# Patient Record
Sex: MALE | Race: BLACK OR AFRICAN AMERICAN | NOT HISPANIC OR LATINO | URBAN - METROPOLITAN AREA
[De-identification: names, ages, dates, MRNs, and addresses within clinical notes are randomized per-mention and may not be internally consistent; named-entity substitution may affect disease eponyms.]

---

## 2024-06-04 ENCOUNTER — HOSPITAL ENCOUNTER (EMERGENCY)
Facility: CLINIC | Age: 39
Discharge: HOME | End: 2024-06-04
Attending: FAMILY MEDICINE | Admitting: FAMILY MEDICINE
Payer: COMMERCIAL

## 2024-06-04 ENCOUNTER — APPOINTMENT (OUTPATIENT)
Dept: CT IMAGING | Facility: CLINIC | Age: 39
End: 2024-06-04
Attending: FAMILY MEDICINE
Payer: COMMERCIAL

## 2024-06-04 VITALS
RESPIRATION RATE: 18 BRPM | DIASTOLIC BLOOD PRESSURE: 73 MMHG | TEMPERATURE: 98.4 F | SYSTOLIC BLOOD PRESSURE: 120 MMHG | OXYGEN SATURATION: 99 % | WEIGHT: 180 LBS | HEART RATE: 73 BPM

## 2024-06-04 DIAGNOSIS — R10.11 RIGHT UPPER QUADRANT ABDOMINAL PAIN: ICD-10-CM

## 2024-06-04 LAB
ALBUMIN SERPL BCG-MCNC: 4.2 G/DL (ref 3.5–5.2)
ALBUMIN UR-MCNC: NEGATIVE MG/DL
ALP SERPL-CCNC: 117 U/L (ref 40–150)
ALT SERPL W P-5'-P-CCNC: 49 U/L (ref 0–70)
ANION GAP SERPL CALCULATED.3IONS-SCNC: 8 MMOL/L (ref 7–15)
APPEARANCE UR: CLEAR
AST SERPL W P-5'-P-CCNC: 53 U/L (ref 0–45)
BASOPHILS # BLD AUTO: 0 10E3/UL (ref 0–0.2)
BASOPHILS NFR BLD AUTO: 1 %
BILIRUB SERPL-MCNC: 0.4 MG/DL
BILIRUB UR QL STRIP: NEGATIVE
BUN SERPL-MCNC: 5.4 MG/DL (ref 6–20)
CALCIUM SERPL-MCNC: 9 MG/DL (ref 8.6–10)
CHLORIDE SERPL-SCNC: 99 MMOL/L (ref 98–107)
COLOR UR AUTO: ABNORMAL
CREAT SERPL-MCNC: 0.73 MG/DL (ref 0.67–1.17)
DEPRECATED HCO3 PLAS-SCNC: 26 MMOL/L (ref 22–29)
EGFRCR SERPLBLD CKD-EPI 2021: >90 ML/MIN/1.73M2
EOSINOPHIL # BLD AUTO: 0.1 10E3/UL (ref 0–0.7)
EOSINOPHIL NFR BLD AUTO: 2 %
ERYTHROCYTE [DISTWIDTH] IN BLOOD BY AUTOMATED COUNT: 12.1 % (ref 10–15)
GLUCOSE SERPL-MCNC: 182 MG/DL (ref 70–99)
GLUCOSE UR STRIP-MCNC: NEGATIVE MG/DL
HCT VFR BLD AUTO: 41.1 % (ref 40–53)
HGB BLD-MCNC: 14.4 G/DL (ref 13.3–17.7)
HGB UR QL STRIP: NEGATIVE
IMM GRANULOCYTES # BLD: 0 10E3/UL
IMM GRANULOCYTES NFR BLD: 0 %
KETONES UR STRIP-MCNC: NEGATIVE MG/DL
LEUKOCYTE ESTERASE UR QL STRIP: NEGATIVE
LIPASE SERPL-CCNC: 21 U/L (ref 13–60)
LYMPHOCYTES # BLD AUTO: 2.5 10E3/UL (ref 0.8–5.3)
LYMPHOCYTES NFR BLD AUTO: 55 %
MCH RBC QN AUTO: 31.4 PG (ref 26.5–33)
MCHC RBC AUTO-ENTMCNC: 35 G/DL (ref 31.5–36.5)
MCV RBC AUTO: 90 FL (ref 78–100)
MONOCYTES # BLD AUTO: 0.7 10E3/UL (ref 0–1.3)
MONOCYTES NFR BLD AUTO: 15 %
NEUTROPHILS # BLD AUTO: 1.2 10E3/UL (ref 1.6–8.3)
NEUTROPHILS NFR BLD AUTO: 27 %
NITRATE UR QL: NEGATIVE
NRBC # BLD AUTO: 0 10E3/UL
NRBC BLD AUTO-RTO: 0 /100
PH UR STRIP: 8.5 [PH] (ref 5–7)
PLATELET # BLD AUTO: 226 10E3/UL (ref 150–450)
POTASSIUM SERPL-SCNC: 4.1 MMOL/L (ref 3.4–5.3)
PROT SERPL-MCNC: 7.7 G/DL (ref 6.4–8.3)
RBC # BLD AUTO: 4.59 10E6/UL (ref 4.4–5.9)
RBC URINE: <1 /HPF
SODIUM SERPL-SCNC: 133 MMOL/L (ref 135–145)
SP GR UR STRIP: >1.05 (ref 1–1.03)
SQUAMOUS EPITHELIAL: 1 /HPF
UROBILINOGEN UR STRIP-MCNC: NORMAL MG/DL
WBC # BLD AUTO: 4.5 10E3/UL (ref 4–11)
WBC URINE: 1 /HPF

## 2024-06-04 PROCEDURE — 74177 CT ABD & PELVIS W/CONTRAST: CPT

## 2024-06-04 PROCEDURE — 81001 URINALYSIS AUTO W/SCOPE: CPT | Performed by: FAMILY MEDICINE

## 2024-06-04 PROCEDURE — 85004 AUTOMATED DIFF WBC COUNT: CPT | Performed by: FAMILY MEDICINE

## 2024-06-04 PROCEDURE — 99285 EMERGENCY DEPT VISIT HI MDM: CPT | Mod: 25 | Performed by: FAMILY MEDICINE

## 2024-06-04 PROCEDURE — 83690 ASSAY OF LIPASE: CPT | Performed by: FAMILY MEDICINE

## 2024-06-04 PROCEDURE — 99284 EMERGENCY DEPT VISIT MOD MDM: CPT | Performed by: FAMILY MEDICINE

## 2024-06-04 PROCEDURE — 36415 COLL VENOUS BLD VENIPUNCTURE: CPT | Performed by: FAMILY MEDICINE

## 2024-06-04 PROCEDURE — 80053 COMPREHEN METABOLIC PANEL: CPT | Performed by: FAMILY MEDICINE

## 2024-06-04 PROCEDURE — 250N000009 HC RX 250: Performed by: FAMILY MEDICINE

## 2024-06-04 PROCEDURE — 250N000011 HC RX IP 250 OP 636: Performed by: FAMILY MEDICINE

## 2024-06-04 RX ORDER — IOPAMIDOL 755 MG/ML
100 INJECTION, SOLUTION INTRAVASCULAR ONCE
Status: COMPLETED | OUTPATIENT
Start: 2024-06-04 | End: 2024-06-04

## 2024-06-04 RX ORDER — FAMOTIDINE 20 MG/1
20 TABLET, FILM COATED ORAL 2 TIMES DAILY
Qty: 30 TABLET | Refills: 0 | Status: SHIPPED | OUTPATIENT
Start: 2024-06-04

## 2024-06-04 RX ADMIN — IOPAMIDOL 89 ML: 755 INJECTION, SOLUTION INTRAVENOUS at 18:11

## 2024-06-04 RX ADMIN — SODIUM CHLORIDE 44 ML: 9 INJECTION, SOLUTION INTRAVENOUS at 18:11

## 2024-06-04 ASSESSMENT — ACTIVITIES OF DAILY LIVING (ADL)
ADLS_ACUITY_SCORE: 35
ADLS_ACUITY_SCORE: 35
ADLS_ACUITY_SCORE: 33
ADLS_ACUITY_SCORE: 35
ADLS_ACUITY_SCORE: 35

## 2024-06-04 ASSESSMENT — COLUMBIA-SUICIDE SEVERITY RATING SCALE - C-SSRS
1. IN THE PAST MONTH, HAVE YOU WISHED YOU WERE DEAD OR WISHED YOU COULD GO TO SLEEP AND NOT WAKE UP?: NO
2. HAVE YOU ACTUALLY HAD ANY THOUGHTS OF KILLING YOURSELF IN THE PAST MONTH?: NO
6. HAVE YOU EVER DONE ANYTHING, STARTED TO DO ANYTHING, OR PREPARED TO DO ANYTHING TO END YOUR LIFE?: NO

## 2024-06-04 NOTE — ED PROVIDER NOTES
ED Provider Note  [facility]      History     Chief Complaint   Patient presents with    Abdominal Pain     Right abdomen pain that started yesterday when he was playing soccer.  Denies any injuries then.  Also having subjective fevers and did vomit one time last night.  Currently has nausea.     The history is provided by the patient and medical records.     Venkat Tompkins is a 38 year old male with no medical history on file presents to the emergency department due to abdominal pain x 1 day.    [facility]  used via iPad.  Patient is having abdominal pain.  Pain began yesterday with right upper quadrant, which then moved to the middle, then left, then wrapped around to his left kidney.  Patient states that pain is constant, is unsure what makes it worse, states food does not make it worse.  Patient states pain gets bad enough that he has to sit down and rest.  Patient denies fever, troubles breathing, diarrhea, current nausea, or urinary problems.  Denies surgery on stomach.  Patient does have constipation.    Past Medical History  No past medical history on file.  No past surgical history on file.  No current outpatient medications on file.    No Known Allergies  Family History  No family history on file.  Social History       A medically appropriate review of systems was performed with pertinent positives and negatives noted in the HPI, and all other systems negative.    Physical Exam   BP: 121/77  Pulse: 81  Temp: 98.4  F (36.9  C)  Resp: 16  Weight: 81.6 kg (180 lb)  SpO2: 99 %  Physical Exam  Vitals and nursing note reviewed.   Constitutional:       General: He is not in acute distress.     Appearance: Normal appearance. He is not toxic-appearing.   HENT:      Head: Atraumatic.   Eyes:      General: No scleral icterus.     Conjunctiva/sclera: Conjunctivae normal.   Cardiovascular:      Rate and Rhythm: Normal rate.      Heart sounds: Normal heart sounds.   Pulmonary:       Effort: Pulmonary effort is normal. No respiratory distress.      Breath sounds: Normal breath sounds.   Abdominal:      Palpations: Abdomen is soft.      Tenderness: There is abdominal tenderness in the right upper quadrant and periumbilical area. There is no guarding or rebound.   Musculoskeletal:         General: No deformity.      Cervical back: Neck supple.   Skin:     General: Skin is warm.   Neurological:      Mental Status: He is alert.           ED Course, Procedures, & Data      Procedures                 Results for orders placed or performed during the hospital encounter of 06/04/24   Comprehensive metabolic panel     Status: Abnormal   Result Value Ref Range    Sodium 133 (L) 135 - 145 mmol/L    Potassium 4.1 3.4 - 5.3 mmol/L    Carbon Dioxide (CO2) 26 22 - 29 mmol/L    Anion Gap 8 7 - 15 mmol/L    Urea Nitrogen 5.4 (L) 6.0 - 20.0 mg/dL    Creatinine 0.73 0.67 - 1.17 mg/dL    GFR Estimate >90 >60 mL/min/1.73m2    Calcium 9.0 8.6 - 10.0 mg/dL    Chloride 99 98 - 107 mmol/L    Glucose 182 (H) 70 - 99 mg/dL    Alkaline Phosphatase 117 40 - 150 U/L    AST 53 (H) 0 - 45 U/L    ALT 49 0 - 70 U/L    Protein Total 7.7 6.4 - 8.3 g/dL    Albumin 4.2 3.5 - 5.2 g/dL    Bilirubin Total 0.4 <=1.2 mg/dL   Lipase     Status: Normal   Result Value Ref Range    Lipase 21 13 - 60 U/L   CBC with platelets and differential     Status: Abnormal   Result Value Ref Range    WBC Count 4.5 4.0 - 11.0 10e3/uL    RBC Count 4.59 4.40 - 5.90 10e6/uL    Hemoglobin 14.4 13.3 - 17.7 g/dL    Hematocrit 41.1 40.0 - 53.0 %    MCV 90 78 - 100 fL    MCH 31.4 26.5 - 33.0 pg    MCHC 35.0 31.5 - 36.5 g/dL    RDW 12.1 10.0 - 15.0 %    Platelet Count 226 150 - 450 10e3/uL    % Neutrophils 27 %    % Lymphocytes 55 %    % Monocytes 15 %    % Eosinophils 2 %    % Basophils 1 %    % Immature Granulocytes 0 %    NRBCs per 100 WBC 0 <1 /100    Absolute Neutrophils 1.2 (L) 1.6 - 8.3 10e3/uL    Absolute Lymphocytes 2.5 0.8 - 5.3 10e3/uL    Absolute  Monocytes 0.7 0.0 - 1.3 10e3/uL    Absolute Eosinophils 0.1 0.0 - 0.7 10e3/uL    Absolute Basophils 0.0 0.0 - 0.2 10e3/uL    Absolute Immature Granulocytes 0.0 <=0.4 10e3/uL    Absolute NRBCs 0.0 10e3/uL   CBC with platelets differential     Status: Abnormal    Narrative    The following orders were created for panel order CBC with platelets differential.  Procedure                               Abnormality         Status                     ---------                               -----------         ------                     CBC with platelets and d...[[id number]]  Abnormal            Final result                 Please view results for these tests on the individual orders.     Medications - No data to display  Labs Ordered and Resulted from Time of ED Arrival to Time of ED Departure   COMPREHENSIVE METABOLIC PANEL - Abnormal       Result Value    Sodium 133 (*)     Potassium 4.1      Carbon Dioxide (CO2) 26      Anion Gap 8      Urea Nitrogen 5.4 (*)     Creatinine 0.73      GFR Estimate >90      Calcium 9.0      Chloride 99      Glucose 182 (*)     Alkaline Phosphatase 117      AST 53 (*)     ALT 49      Protein Total 7.7      Albumin 4.2      Bilirubin Total 0.4     CBC WITH PLATELETS AND DIFFERENTIAL - Abnormal    WBC Count 4.5      RBC Count 4.59      Hemoglobin 14.4      Hematocrit 41.1      MCV 90      MCH 31.4      MCHC 35.0      RDW 12.1      Platelet Count 226      % Neutrophils 27      % Lymphocytes 55      % Monocytes 15      % Eosinophils 2      % Basophils 1      % Immature Granulocytes 0      NRBCs per 100 WBC 0      Absolute Neutrophils 1.2 (*)     Absolute Lymphocytes 2.5      Absolute Monocytes 0.7      Absolute Eosinophils 0.1      Absolute Basophils 0.0      Absolute Immature Granulocytes 0.0      Absolute NRBCs 0.0     LIPASE - Normal    Lipase 21     ROUTINE UA WITH MICROSCOPIC REFLEX TO CULTURE     No orders to display          Critical care was not performed.     Medical Decision Making  The  patient's presentation was of moderate complexity (an acute illness with systemic symptoms).    The patient's evaluation involved:  ordering and/or review of 3+ test(s) in this encounter (see separate area of note for details)    The patient's management necessitated further care after sign-out to Dr. Benson (see their note for further management).    Assessment & Plan    Is a healthy 38-year-old male with acute onset of right upper quadrant pain, now also some right flank pain and periumbilical pain.  Initial differential diagnosis included but was not restricted to cholecystitis or cholelithiasis, cholangitis, pancreatitis, typical presentation of appendicitis gastritis, peptic ulcer disease, duodenitis, small bowel obstruction,  pyelonephritis, ureterolithiasis, as well as numerous other life-threatening and the life-threatening causes of epigastric and right upper quadrant pain.  Patient's labs with mild elevation of AST otherwise unremarkable.  His urinalysis is still pending.  I reexamined him he is  but without surgical findings.  Point a CT of the abdomen pelvis was ordered to rule out surgical or life-threatening pathology.  If negative could likely be discharged home with symptomatic treatment, patient is being signed out to Dr. Benson at shift change to review CT.      I have reviewed the nursing notes. I have reviewed the findings, diagnosis, plan and need for follow up with the patient.    New Prescriptions    No medications on file       Final diagnoses:   Right upper quadrant abdominal pain   I, Gerardo Garrett, am serving as a trained medical scribe to document services personally performed by Milo Grove MD, based to the provider's statements to me.     IMilo MD, was physically present and have reviewed and verified the accuracy of this note documented by Gerardo Garrett.       Milo Grove MD  [facility] EMERGENCY DEPARTMENT  6/4/2024     Milo Grove,  MD  06/04/24 3573

## 2024-06-04 NOTE — ED TRIAGE NOTES
Triage Assessment (Adult)       Row Name 06/04/24 1427          Triage Assessment    Airway WDL WDL        Respiratory WDL    Respiratory WDL WDL        Skin Circulation/Temperature WDL    Skin Circulation/Temperature WDL WDL        Cardiac WDL    Cardiac WDL WDL        Peripheral/Neurovascular WDL    Peripheral Neurovascular WDL WDL        Cognitive/Neuro/Behavioral WDL    Cognitive/Neuro/Behavioral WDL WDL

## 2024-06-05 NOTE — ED PROVIDER NOTES
Patient received as sign-out at change of shift.  Please see initial note for complete details.  38 year old male who presented to the ED with upper abdominal pain.  Awaiting abdominal CT.  Acute events during this shift:   Results for orders placed or performed during the hospital encounter of 06/04/24   CT Abdomen Pelvis w Contrast     Status: None    Narrative    EXAM: CT ABDOMEN PELVIS W CONTRAST  LOCATION: [facility]  DATE: 6/4/2024    INDICATION: Right sided abdominal pain  COMPARISON: None.  TECHNIQUE: CT scan of the abdomen and pelvis was performed following injection of IV contrast. Multiplanar reformats were obtained. Dose reduction techniques were used.  CONTRAST: 89mL Isovue 370    FINDINGS:   LOWER CHEST: Normal.    HEPATOBILIARY: Diffuse hepatic steatosis. No significant mass. No bile duct dilatation. No calcified gallstones.    PANCREAS: Normal.    SPLEEN: Normal.    ADRENAL GLANDS: Normal.    KIDNEYS/BLADDER: No significant mass, stone, or hydronephrosis. Overdistended bladder.    BOWEL: Normal.    LYMPH NODES: Normal.    VASCULATURE: Normal.    PELVIC ORGANS: Normal.    MUSCULOSKELETAL: Normal.      Impression    IMPRESSION:   1.  No acute findings in the abdomen or pelvis.    2.  Hepatic steatosis.    3.  Overdistended urinary bladder, but no evidence of acute inflammation.   Comprehensive metabolic panel     Status: Abnormal   Result Value Ref Range    Sodium 133 (L) 135 - 145 mmol/L    Potassium 4.1 3.4 - 5.3 mmol/L    Carbon Dioxide (CO2) 26 22 - 29 mmol/L    Anion Gap 8 7 - 15 mmol/L    Urea Nitrogen 5.4 (L) 6.0 - 20.0 mg/dL    Creatinine 0.73 0.67 - 1.17 mg/dL    GFR Estimate >90 >60 mL/min/1.73m2    Calcium 9.0 8.6 - 10.0 mg/dL    Chloride 99 98 - 107 mmol/L    Glucose 182 (H) 70 - 99 mg/dL    Alkaline Phosphatase 117 40 - 150 U/L    AST 53 (H) 0 - 45 U/L    ALT 49 0 - 70 U/L    Protein Total 7.7 6.4 - 8.3 g/dL    Albumin 4.2 3.5 - 5.2 g/dL     Bilirubin Total 0.4 <=1.2 mg/dL   Lipase     Status: Normal   Result Value Ref Range    Lipase 21 13 - 60 U/L   UA with Microscopic reflex to Culture     Status: Abnormal    Specimen: Urine, Midstream   Result Value Ref Range    Color Urine Straw Colorless, Straw, Light Yellow, Yellow    Appearance Urine Clear Clear    Glucose Urine Negative Negative mg/dL    Bilirubin Urine Negative Negative    Ketones Urine Negative Negative mg/dL    Specific Gravity Urine >1.050 (H) 1.003 - 1.035    Blood Urine Negative Negative    pH Urine 8.5 (H) 5.0 - 7.0    Protein Albumin Urine Negative Negative mg/dL    Urobilinogen Urine Normal Normal, 2.0 mg/dL    Nitrite Urine Negative Negative    Leukocyte Esterase Urine Negative Negative    RBC Urine <1 <=2 /HPF    WBC Urine 1 <=5 /HPF    Squamous Epithelials Urine 1 <=1 /HPF    Narrative    Urine Culture not indicated   CBC with platelets and differential     Status: Abnormal   Result Value Ref Range    WBC Count 4.5 4.0 - 11.0 10e3/uL    RBC Count 4.59 4.40 - 5.90 10e6/uL    Hemoglobin 14.4 13.3 - 17.7 g/dL    Hematocrit 41.1 40.0 - 53.0 %    MCV 90 78 - 100 fL    MCH 31.4 26.5 - 33.0 pg    MCHC 35.0 31.5 - 36.5 g/dL    RDW 12.1 10.0 - 15.0 %    Platelet Count 226 150 - 450 10e3/uL    % Neutrophils 27 %    % Lymphocytes 55 %    % Monocytes 15 %    % Eosinophils 2 %    % Basophils 1 %    % Immature Granulocytes 0 %    NRBCs per 100 WBC 0 <1 /100    Absolute Neutrophils 1.2 (L) 1.6 - 8.3 10e3/uL    Absolute Lymphocytes 2.5 0.8 - 5.3 10e3/uL    Absolute Monocytes 0.7 0.0 - 1.3 10e3/uL    Absolute Eosinophils 0.1 0.0 - 0.7 10e3/uL    Absolute Basophils 0.0 0.0 - 0.2 10e3/uL    Absolute Immature Granulocytes 0.0 <=0.4 10e3/uL    Absolute NRBCs 0.0 10e3/uL   CBC with platelets differential     Status: Abnormal    Narrative    The following orders were created for panel order CBC with platelets differential.  Procedure                               Abnormality         Status                      ---------                               -----------         ------                     CBC with platelets and d...[[id number]]  Abnormal            Final result                 Please view results for these tests on the individual orders.   Will discharge to home on famotidine.     Kareem Benson MD  06/04/24 1951

## 2024-06-05 NOTE — DISCHARGE INSTRUCTIONS
Take famotidine as directed.    Follow-up with your primary care clinic in 1 week.    Return if fever, vomiting, or other concerns.

## 2024-12-16 ENCOUNTER — OFFICE VISIT (OUTPATIENT)
Dept: FAMILY MEDICINE | Facility: CLINIC | Age: 39
End: 2024-12-16
Payer: COMMERCIAL

## 2024-12-16 ENCOUNTER — VIRTUAL VISIT (OUTPATIENT)
Dept: INTERPRETER SERVICES | Facility: CLINIC | Age: 39
End: 2024-12-16
Payer: COMMERCIAL

## 2024-12-16 VITALS
WEIGHT: 179 LBS | TEMPERATURE: 97.9 F | HEART RATE: 61 BPM | RESPIRATION RATE: 16 BRPM | HEIGHT: 68 IN | DIASTOLIC BLOOD PRESSURE: 85 MMHG | BODY MASS INDEX: 27.13 KG/M2 | OXYGEN SATURATION: 97 % | SYSTOLIC BLOOD PRESSURE: 127 MMHG

## 2024-12-16 DIAGNOSIS — J02.9 ACUTE PHARYNGITIS, UNSPECIFIED ETIOLOGY: ICD-10-CM

## 2024-12-16 DIAGNOSIS — Z86.19 HISTORY OF HEPATITIS B: ICD-10-CM

## 2024-12-16 DIAGNOSIS — Z78.9 HISTORY OF MEASLES, MUMPS, RUBELLA (MMR) VACCINATION UNKNOWN: ICD-10-CM

## 2024-12-16 DIAGNOSIS — Z11.4 SCREENING FOR HIV (HUMAN IMMUNODEFICIENCY VIRUS): ICD-10-CM

## 2024-12-16 DIAGNOSIS — Z13.1 SCREENING FOR DIABETES MELLITUS: ICD-10-CM

## 2024-12-16 DIAGNOSIS — Z23 NEED FOR VACCINATION: ICD-10-CM

## 2024-12-16 DIAGNOSIS — Z76.89 ENCOUNTER TO ESTABLISH CARE WITH NEW DOCTOR: Primary | ICD-10-CM

## 2024-12-16 DIAGNOSIS — K76.0 HEPATIC STEATOSIS: ICD-10-CM

## 2024-12-16 DIAGNOSIS — Z11.59 NEED FOR HEPATITIS C SCREENING TEST: ICD-10-CM

## 2024-12-16 LAB
ALBUMIN SERPL BCG-MCNC: 4.3 G/DL (ref 3.5–5.2)
ALP SERPL-CCNC: 149 U/L (ref 40–150)
ALT SERPL W P-5'-P-CCNC: 38 U/L (ref 0–70)
ANION GAP SERPL CALCULATED.3IONS-SCNC: 10 MMOL/L (ref 7–15)
AST SERPL W P-5'-P-CCNC: 30 U/L (ref 0–45)
BILIRUB SERPL-MCNC: 0.4 MG/DL
BUN SERPL-MCNC: 10.7 MG/DL (ref 6–20)
CALCIUM SERPL-MCNC: 9.5 MG/DL (ref 8.8–10.4)
CHLORIDE SERPL-SCNC: 95 MMOL/L (ref 98–107)
CHOLEST SERPL-MCNC: 230 MG/DL
CREAT SERPL-MCNC: 0.67 MG/DL (ref 0.67–1.17)
EGFRCR SERPLBLD CKD-EPI 2021: >90 ML/MIN/1.73M2
EST. AVERAGE GLUCOSE BLD GHB EST-MCNC: 272 MG/DL
FASTING STATUS PATIENT QL REPORTED: YES
FASTING STATUS PATIENT QL REPORTED: YES
GLUCOSE SERPL-MCNC: 298 MG/DL (ref 70–99)
HBA1C MFR BLD: 11.1 % (ref 0–5.6)
HBV CORE AB SERPL QL IA: REACTIVE
HBV SURFACE AB SERPL IA-ACNC: <3.5 M[IU]/ML
HBV SURFACE AB SERPL IA-ACNC: NONREACTIVE M[IU]/ML
HBV SURFACE AG SERPL QL IA: REACTIVE
HCO3 SERPL-SCNC: 28 MMOL/L (ref 22–29)
HCV AB SERPL QL IA: NONREACTIVE
HDLC SERPL-MCNC: 34 MG/DL
HIV 1+2 AB+HIV1 P24 AG SERPL QL IA: NONREACTIVE
LDLC SERPL CALC-MCNC: 136 MG/DL
NONHDLC SERPL-MCNC: 196 MG/DL
POTASSIUM SERPL-SCNC: 4.1 MMOL/L (ref 3.4–5.3)
PROT SERPL-MCNC: 8.4 G/DL (ref 6.4–8.3)
SODIUM SERPL-SCNC: 133 MMOL/L (ref 135–145)
TRIGL SERPL-MCNC: 300 MG/DL

## 2024-12-16 NOTE — PROGRESS NOTES
Assessment & Plan     Encounter to establish care with new doctor  Patient immigrated to the [place] in January of 2024. His wife is seen at \[facility]\"" thus he is looking to establish here. Dr Headley will be his new PCP.    Chronic pharyngitis, unspecified etiology  Patient describes having issues with his tonsils for many years. With patients permission the conversation regarding this issue was postponed for his follow up visit due to time constraints.    Screening for HIV (human immunodeficiency virus)  - HIV Screening; Future    Need for hepatitis C screening test  - Hepatitis C Screen Reflex to HCV RNA Quant and Genotype; Future    Screening for diabetes mellitus  Patient told that he was prediabetic in the past. While in the ED his BG was 182.  - Hemoglobin A1c; Future    History of hepatitis B  Patient describes having been told that he tested positive for Hep B, however was not told if this was active or chronic.  - Hepatitis B core antibody; Future  - Hepatitis B surface antigen; Future  - Hepatitis B Surface Antibody; Future  - Comprehensive metabolic panel; Future    Hepatic steatosis  Hepatic steatosis was seen on imaging when he was seen in the ED. On exam arcus senilis present. Obtaining lipid panel for baseline and in the context of presumed chronic Hep B  - Lipid panel reflex to direct LDL Non-fasting; Future    History of measles, mumps, rubella (MMR) vaccination unknown  Unknown vaccine record.   - Rubeola Antibody IgG; Future  - Rubella Antibody IgG; Future  - Mumps Immune Status, IgG; Future    Need for vaccination  - TDAP 10-64Y (ADACEL,BOOSTRIX)  - INFLUENZA VACCINE,SPLIT VIRUS,TRIVALENT,PF(FLUZONE)  - COVID-19 12+ (PFIZER)    The longitudinal plan of care for the diagnosis(es)/condition(s) as documented were addressed during this visit. Due to the added complexity in care, I will continue to support Venkat in the subsequent management and with ongoing continuity of care.  Return  "in about 4 weeks (around 1/13/2025).    Karol Robledo is a 39 year old, presenting for the following health issues:  OTHER ([facility] care)        12/16/2024     8:08 AM   Additional Questions   Roomed by Jw   Accompanied by Luciana     ANG   Recently was seen in the ED in June for abdominal pain. He was given some Pepsid for 2-3 weeks and this resolved his symptoms and they have not returned. Additionally he describes occasionally burning with urination, generally when he has not gotten adequate hydration. When this happens his urine is dark yellow, the color of cumin he says. This has not happened recently, and is not having flank pain any longer.    Past Medical History  - Left kidney stone - Was told by a doctor in [facility] that he may have a stone. Burning while urination. No blood in urine. Has never passed a stone. Never been treated.     - Possible Type 2 Diabetes - Labs ordered in ED showed a elevated glucose to 182. No notes on if this was fasting. Will order an A1C today to evaluate further    - Diffuse Hepatic Steatosis - Seen on imaging when seen in the ED for RUQ abdominal pain. The remainder of the CT did not show anything to explain the RUQ pain.    - Possible chronic Hepatitis B - Patient thinks that they may have chronic hep B.    Past surgical history  None    Meds   None    Social history  Immigrated to the  in January 2024   Alcohol - Never  Drugs - Never  Smoking - Never    Immunizations  No records. Has had a few, does not know which ones. Amenable to getting needed vaccines.    Review of Systems  Constitutional, eye, ENT, skin, respiratory, cardiac, and GI are normal except as otherwise noted.    Objective    /85   Pulse 61   Temp 97.9  F (36.6  C) (Oral)   Resp 16   Ht 1.727 m (5' 8\")   Wt 81.2 kg (179 lb)   SpO2 97%   BMI 27.22 kg/m    Physical Exam   Vitals reviewed.  Constitutional: awake, alert, cooperative, in NAD  Eyes: Sclera without jaundice or injection, PERRLA, EOM " intact. Arcus senilis Present  HENT: NCAT without lesions, oral cavity with MMM, intact dentition  Respiratory: Lungs CTAB without crackles, wheezing, rales, or increased work of breathing  Cardiovascular: RRR without murmurs or extra sounds, radial pulses 2+ bilaterally  Abdomen: Soft, non-distended, non-tender, positive bowel sounds  Skin: Warm & dry, without lesions, erythema, rashes, or ecchymosis  Musculoskeletal: No extremity redness, swelling, or bony tenderness  Neurologic: A&Ox4, without focal deficit, cranial nerves II-XII grossly intact  Psychiatric: Alert & calm with appropriate affect, mood, insight, and thought processes       The following results were reviewed:   No visits with results within 1 Day(s) from this visit.   Latest known visit with results is:   Admission on 06/04/2024, Discharged on 06/04/2024   Component Date Value    Sodium 06/04/2024 133 (L)     Potassium 06/04/2024 4.1     Carbon Dioxide (CO2) 06/04/2024 26     Anion Gap 06/04/2024 8     Urea Nitrogen 06/04/2024 5.4 (L)     Creatinine 06/04/2024 0.73     GFR Estimate 06/04/2024 >90     Calcium 06/04/2024 9.0     Chloride 06/04/2024 99     Glucose 06/04/2024 182 (H)     Alkaline Phosphatase 06/04/2024 117     AST 06/04/2024 53 (H)     ALT 06/04/2024 49     Protein Total 06/04/2024 7.7     Albumin 06/04/2024 4.2     Bilirubin Total 06/04/2024 0.4     Lipase 06/04/2024 21     Color Urine 06/04/2024 Straw     Appearance Urine 06/04/2024 Clear     Glucose Urine 06/04/2024 Negative     Bilirubin Urine 06/04/2024 Negative     Ketones Urine 06/04/2024 Negative     Specific Gravity Urine 06/04/2024 >1.050 (H)     Blood Urine 06/04/2024 Negative     pH Urine 06/04/2024 8.5 (H)     Protein Albumin Urine 06/04/2024 Negative     Urobilinogen Urine 06/04/2024 Normal     Nitrite Urine 06/04/2024 Negative     Leukocyte Esterase Urine 06/04/2024 Negative     RBC Urine 06/04/2024 <1     WBC Urine 06/04/2024 1     Squamous Epithelials Uri* 06/04/2024 1      WBC Count 06/04/2024 4.5     RBC Count 06/04/2024 4.59     Hemoglobin 06/04/2024 14.4     Hematocrit 06/04/2024 41.1     MCV 06/04/2024 90     MCH 06/04/2024 31.4     MCHC 06/04/2024 35.0     RDW 06/04/2024 12.1     Platelet Count 06/04/2024 226     % Neutrophils 06/04/2024 27     % Lymphocytes 06/04/2024 55     % Monocytes 06/04/2024 15     % Eosinophils 06/04/2024 2     % Basophils 06/04/2024 1     % Immature Granulocytes 06/04/2024 0     NRBCs per 100 WBC 06/04/2024 0     Absolute Neutrophils 06/04/2024 1.2 (L)     Absolute Lymphocytes 06/04/2024 2.5     Absolute Monocytes 06/04/2024 0.7     Absolute Eosinophils 06/04/2024 0.1     Absolute Basophils 06/04/2024 0.0     Absolute Immature Granul* 06/04/2024 0.0     Absolute NRBCs 06/04/2024 0.0       Alexei Chavez MS-3     I was present with the medical student who participated in the service and in the documentation of this note. I have verified the history and personally performed the physical exam and medical decision making, and have verified the content of the note, which accurately reflects my assessment of the patient and the plan of care.  Signed Electronically by: Ron Headley DO

## 2024-12-16 NOTE — PROGRESS NOTES
Preceptor Attestation:    I discussed the patient with the resident and evaluated the patient in person. I have verified the content of the note, which accurately reflects my assessment of the patient and the plan of care.   Supervising Physician:  Mukesh Gunn MD.

## 2024-12-17 LAB
MEV IGG SER IA-ACNC: >300 AU/ML
MEV IGG SER IA-ACNC: POSITIVE
MUMPS ANTIBODY IGG INSTRUMENT VALUE: 75.9 AU/ML
MUV IGG SER QL IA: POSITIVE
RUBV IGG SERPL QL IA: >33 INDEX
RUBV IGG SERPL QL IA: POSITIVE

## 2024-12-20 ENCOUNTER — OFFICE VISIT (OUTPATIENT)
Dept: FAMILY MEDICINE | Facility: CLINIC | Age: 39
End: 2024-12-20
Payer: COMMERCIAL

## 2024-12-20 VITALS
HEART RATE: 95 BPM | BODY MASS INDEX: 26.81 KG/M2 | SYSTOLIC BLOOD PRESSURE: 134 MMHG | WEIGHT: 176.9 LBS | RESPIRATION RATE: 15 BRPM | TEMPERATURE: 98.1 F | OXYGEN SATURATION: 95 % | HEIGHT: 68 IN | DIASTOLIC BLOOD PRESSURE: 83 MMHG

## 2024-12-20 DIAGNOSIS — B19.10 HEPATITIS B INFECTION WITHOUT DELTA AGENT WITHOUT HEPATIC COMA, UNSPECIFIED CHRONICITY: Primary | ICD-10-CM

## 2024-12-20 DIAGNOSIS — E11.9 TYPE 2 DIABETES MELLITUS WITHOUT COMPLICATION, WITHOUT LONG-TERM CURRENT USE OF INSULIN (H): ICD-10-CM

## 2024-12-20 LAB
ERYTHROCYTE [DISTWIDTH] IN BLOOD BY AUTOMATED COUNT: 11.6 % (ref 10–15)
HCT VFR BLD AUTO: 44.1 % (ref 40–53)
HGB BLD-MCNC: 15 G/DL (ref 13.3–17.7)
INR PPP: 1.1 (ref 0.85–1.15)
MCH RBC QN AUTO: 29.9 PG (ref 26.5–33)
MCHC RBC AUTO-ENTMCNC: 34 G/DL (ref 31.5–36.5)
MCV RBC AUTO: 88 FL (ref 78–100)
PLATELET # BLD AUTO: 247 10E3/UL (ref 150–450)
RBC # BLD AUTO: 5.01 10E6/UL (ref 4.4–5.9)
WBC # BLD AUTO: 4.3 10E3/UL (ref 4–11)

## 2024-12-20 RX ORDER — LANCETS
EACH MISCELLANEOUS
Qty: 100 EACH | Refills: 6 | Status: SHIPPED | OUTPATIENT
Start: 2024-12-20

## 2024-12-20 RX ORDER — METFORMIN HYDROCHLORIDE 500 MG/1
TABLET, EXTENDED RELEASE ORAL
Qty: 360 TABLET | Refills: 0 | Status: SHIPPED | OUTPATIENT
Start: 2024-12-20 | End: 2025-03-20

## 2024-12-20 RX ORDER — FAMOTIDINE 20 MG/1
20 TABLET, FILM COATED ORAL 2 TIMES DAILY
Qty: 30 TABLET | Refills: 0 | Status: CANCELLED | OUTPATIENT
Start: 2024-12-20

## 2024-12-20 RX ORDER — ROSUVASTATIN CALCIUM 10 MG/1
10 TABLET, COATED ORAL DAILY
Qty: 30 TABLET | Refills: 2 | Status: SHIPPED | OUTPATIENT
Start: 2024-12-20

## 2024-12-20 ASSESSMENT — PAIN SCALES - GENERAL: PAINLEVEL_OUTOF10: NO PAIN (0)

## 2024-12-20 NOTE — PROGRESS NOTES
Assessment & Plan     Hepatitis B infection without delta agent without hepatic coma, unspecified chronicity  Pt reports hx of hep B infection, but degree of infection currently unclear. Will complete additional lab work-up as below and refer to hepatology.  - Hep B Virus DNA Quant Real Time PCR; Future  - Hepatitis Be antigen; Future  - US Abdomen Limited; Future  - INR; Future  - CBC with platelets; Future  - Adult GI  Referral - Consult Only; Future    Type 2 diabetes mellitus without complication, without long-term current use of insulin (H)  Extensive discussion today regarding new diagnosis of T2DM. Will have him follow-up in  to assess adherence to metformin (should be at full dose by then) and plan for increasing dose of Jardiance to 25mg.   - metFORMIN (GLUCOPHAGE XR) 500 MG 24 hr tablet; Take 1 tablet (500 mg) by mouth daily for 7 days, THEN 2 tablets (1,000 mg) daily for 7 days, THEN 3 tablets (1,500 mg) daily for 7 days, THEN 4 tablets (2,000 mg) daily.  - empagliflozin (JARDIANCE) 10 MG TABS tablet; Take 1 tablet (10 mg) by mouth daily.  - blood glucose monitoring (NO BRAND SPECIFIED) meter device kit; Use to test blood sugar 2 times daily or as directed. Preferred blood glucose meter OR supplies to accompany: Blood Glucose Monitor Brands: per insurance.  - blood glucose (NO BRAND SPECIFIED) test strip; Use to test blood sugar 2 times daily or as directed. To accompany: Blood Glucose Monitor Brands: per insurance.  - thin (NO BRAND SPECIFIED) lancets; Use with lanceting device. To accompany: Blood Glucose Monitor Brands: per insurance.  - Amb Adult Diabetes Educator Referral - Routine; Future  - rosuvastatin (CRESTOR) 10 MG tablet; Take 1 tablet (10 mg) by mouth daily.    The longitudinal plan of care for the diagnosis(es)/condition(s) as documented were addressed during this visit. Due to the added complexity in care, I will continue to support Venkat in the subsequent management and with  "ongoing continuity of care.  Return in about 4 weeks (around 1/17/2025) for Follow up, with me. And follow-up in 1 week with pharmacy for new diabetes visit    Karol Robledo is a 39 year old, presenting for the following health issues:  Lab Result Notice (Lab results)        12/20/2024     9:43 AM   Additional Questions   Roomed by Jessica   Accompanied by Wife and son     HPI     Type 2 Diabetes - Recent visit one week ago with A1c 11.1    Hepatitis B - First learned about it in 2018; doctor [place] saw in [place] gave vitamins for treatment. No antiviral treatment.    Review of Systems  Constitutional, eye, ENT, skin, respiratory, cardiac, and GI are normal except as otherwise noted.    Objective    /83   Pulse 95   Temp 98.1  F (36.7  C) (Oral)   Resp 15   Ht 1.727 m (5' 8\")   Wt 80.2 kg (176 lb 14.4 oz)   SpO2 95%   BMI 26.90 kg/m    Physical Exam   Vitals reviewed.   Constitutional: awake, alert, cooperative, in NAD  Respiratory: Lungs CTAB without increased work of breathing  Cardiovascular: RRR without murmurs or extra sounds  Skin: No visible lesions, erythema, rashes, or ecchymosis  Musculoskeletal: No extremity redness, swelling, or bony tenderness  Neurologic: A&Ox4 without focal deficit, cranial nerves II-XII grossly intact  Psychiatric: Alert & calm with appropriate affect, mood, and thought processes     The following results were reviewed:   Office Visit on 12/16/2024   Component Date Value    HIV Antigen Antibody Com* 12/16/2024 Nonreactive     Hepatitis C Antibody 12/16/2024 Nonreactive     Cholesterol 12/16/2024 230 (H)     Triglycerides 12/16/2024 300 (H)     Direct Measure HDL 12/16/2024 34 (L)     LDL Cholesterol Calculat* 12/16/2024 136 (H)     Non HDL Cholesterol 12/16/2024 196 (H)     Patient Fasting > 8hrs? 12/16/2024 Yes     Estimated Average Glucose 12/16/2024 272 (H)     Hemoglobin A1C 12/16/2024 11.1 (H)     Hepatitis B Core Antibod* 12/16/2024 Reactive (A)     Hepatitis B " Surface Anti* 12/16/2024 Reactive (A)     Hepatitis B Surface Anti* 12/16/2024 Nonreactive     Hepatitis B Surface Anti* 12/16/2024 <3.50     Sodium 12/16/2024 133 (L)     Potassium 12/16/2024 4.1     Carbon Dioxide (CO2) 12/16/2024 28     Anion Gap 12/16/2024 10     Urea Nitrogen 12/16/2024 10.7     Creatinine 12/16/2024 0.67     GFR Estimate 12/16/2024 >90     Calcium 12/16/2024 9.5     Chloride 12/16/2024 95 (L)     Glucose 12/16/2024 298 (H)     Alkaline Phosphatase 12/16/2024 149     AST 12/16/2024 30     ALT 12/16/2024 38     Protein Total 12/16/2024 8.4 (H)     Albumin 12/16/2024 4.3     Bilirubin Total 12/16/2024 0.4     Patient Fasting > 8hrs? 12/16/2024 Yes     Rubeola (Measles) Denice Ig* 12/16/2024 >300.0     Rubeola (Measles) Antibo* 12/16/2024 Positive     Rubella Denice IgG Instrume* 12/16/2024 >33.00     Rubella Antibody IgG 12/16/2024 Positive     Mumps Denice IgG Instrument* 12/16/2024 75.9     Mumps Antibody IgG 12/16/2024 Positive         Signed Electronically by: Ron Headley DO

## 2024-12-22 LAB — HBV E AG SERPL QL IA: NEGATIVE

## 2024-12-23 LAB
HBV DNA SERPL NAA+PROBE-ACNC: 107 IU/ML
HBV DNA SERPL NAA+PROBE-LOG IU: 2 {LOG_IU}/ML

## 2025-01-03 ENCOUNTER — OFFICE VISIT (OUTPATIENT)
Dept: PHARMACY | Facility: CLINIC | Age: 40
End: 2025-01-03

## 2025-01-03 DIAGNOSIS — E11.9 TYPE 2 DIABETES MELLITUS WITHOUT COMPLICATION, WITHOUT LONG-TERM CURRENT USE OF INSULIN (H): Primary | ICD-10-CM

## 2025-01-03 DIAGNOSIS — K21.9 GERD WITHOUT ESOPHAGITIS: ICD-10-CM

## 2025-01-03 DIAGNOSIS — E78.5 DYSLIPIDEMIA: ICD-10-CM

## 2025-01-03 PROCEDURE — 99607 MTMS BY PHARM ADDL 15 MIN: CPT | Performed by: PHARMACIST

## 2025-01-03 PROCEDURE — 99605 MTMS BY PHARM NP 15 MIN: CPT | Performed by: PHARMACIST

## 2025-01-03 RX ORDER — FAMOTIDINE 40 MG/1
40 TABLET, FILM COATED ORAL DAILY
Qty: 30 TABLET | Refills: 11 | Status: SHIPPED | OUTPATIENT
Start: 2025-01-03

## 2025-01-03 NOTE — Clinical Note
Dr. Headley I met with Venkat on Friday and completed some DM education and he will see you in 2 weeks. Thanks Da Vincent, Pharm.D.

## 2025-01-03 NOTE — PROGRESS NOTES
Medication Therapy Management (MTM) Encounter    ASSESSMENT:                            Medication Adherence/Access: No issues identified.    Type 2 diabetes mellitus without complication, without long-term current use of insulin (H)  Uncontrolled  Fasting blood sugars remain above goal.  Patient will continue to titrate metformin and has been tolerating the SGLT2.  Recommend to titrate metformin, focus on lifestyle modifications and recheck A1c in 2 months, before increasing Jardiance to 25 mg daily.  Time spent today discussing specific diet changes to reduce glycemic levels.  Spent time today discussing the importance of exercise and reducing blood sugar levels.    GERD without esophagitis  Uncontrolled  H2 blocker was not received at the pharmacy.  Today we will send a prescription for famotidine 40 mg once a day.  Patient will begin to use this daily.  Also discussed avoiding foods that may trigger acid reflux.    Dyslipidemia  Controlled    PLAN:                          Today's Plan:  Continue with metformin dose titration.  Target dose 1000 mg twice daily.  Patient may decide to use metformin extended release once a day or twice a day.  Encouraged adherence as a priority in deciding timing  Reviewed self-monitoring blood sugar goals  Patient will increase exercise activity.  Commits to going to play soccer or a gym 2 times before his next appointment with his primary care provider  Famotidine prescription sent to the pharmacy        Follow-up: 2 weeks with PCP  No follow-ups on file.  Appointments in Next Year      [place] 15, 2025 1:00 PM  (Arrive by 12:45 PM)  US ABDOMEN LIMITED with MPSYUS1  [facility] ([facility]) [phone]     Jan 21, 2025 10:40 AM  Return Visit with Ron Headley DO  [facility] ([facility]) [phone]     Feb 19, 2025 10:30 AM  (Arrive by 10:15 AM)  [facility] with Felice  Adriana Sanchez PA-C  [facility] [place] ([facility] ) [phone]            SUBJECTIVE/OBJECTIVE:                          Venkat Tompkins is a 39 year old male seen for an initial visit. He was referred to me from Ron Headley DO .      Reason for visit: New DM visit.    Allergies/ADRs: Reviewed in chart  Past Medical History: Reviewed in chart  Tobacco: He reports that he has never smoked. He has never been exposed to tobacco smoke. He has never used smokeless tobacco.  Alcohol: none    Medication Adherence/Access: no issues reported.   Diabetes   Diabetes Medication(s)       Biguanides       metFORMIN (GLUCOPHAGE XR) 500 MG 24 hr tablet Take  2 tablets (1,000 mg) daily for 7 days,        Sodium-Glucose Co-Transporter 2 (SGLT2) Inhibitors       empagliflozin (JARDIANCE) 10 MG TABS tablet Take 1 tablet (10 mg) by mouth daily.           Tolerating both medications    Patient is not experiencing side effects.  Current diabetes symptoms: none  Patient does note that some of his thirst has decreased since starting the medication.    Diet/Exercise: Has been making changes in diet.  He is working to reduce carb intake.  He has discontinued drinking juice.  Today we discussed the importance of eating proteins and vegetables to reduce carbs in his diet.     Uses fingerstick glucometer      Blood sugar monitorin-2 time(s) daily; Ranges: (patient reported)   170 in the AM when he started the meds    In the  mg/dl FASTING    Eye exam in the last 12 months? No  Foot exam: due    Hemoglobin A1C   Date Value Ref Range Status   2024 11.1 (H) 0.0 - 5.6 % Final     Comment:     Normal <5.7%   Prediabetes 5.7-6.4%    Diabetes 6.5% or higher     Note: Adopted from ADA consensus guidelines.      Hyperlipidemia     rosuvastatin 10mg daily  Patient reports no significant myalgias or other side effects.  The 10-year ASCVD risk score (Jennifer  "INA, et al., 2019) is: 7.4%    Values used to calculate the score:      Age: 39 years      Sex: Male      Is Non- : Yes      Diabetic: Yes      Tobacco smoker: No      Systolic Blood Pressure: 134 mmHg      Is BP treated: No      HDL Cholesterol: 34 mg/dL      Total Cholesterol: 230 mg/dL    Cholesterol   Date Value Ref Range Status   12/16/2024 230 (H) <200 mg/dL Final     Direct Measure HDL   Date Value Ref Range Status   12/16/2024 34 (L) >=40 mg/dL Final     LDL Cholesterol Calculated   Date Value Ref Range Status   12/16/2024 136 (H) <100 mg/dL Final     Triglycerides   Date Value Ref Range Status   12/16/2024 300 (H) <150 mg/dL Final     No results found for: \"CHOLHDLRATIO\"       GERD    Did not receive this med from the pharmacy   Famotidine 40 mg once daily   Patient reports no current symptoms.        ----------------      I spent 45 minutes with this patient today. Dr. Ron Headley DO  (resident physician) was provided the recommendations above  via routed note and Dr. Rios is the authorizing prescriber for this visit through the pharmacist collaborative practice agreement.. A copy of the visit note was provided to the patient's provider(s).    A summary of these recommendations was given to the patient.    Da Vincent, Pharm.D.           Medication Therapy Recommendations  Type 2 diabetes mellitus without complication, without long-term current use of insulin (H)   1 Current Medication: metFORMIN (GLUCOPHAGE XR) 500 MG 24 hr tablet   Current Medication Sig: Take 1 tablet (500 mg) by mouth daily for 7 days, THEN 2 tablets (1,000 mg) daily for 7 days, THEN 3 tablets (1,500 mg) daily for 7 days, THEN 4 tablets (2,000 mg) daily.   Rationale: Medication requires monitoring - Needs additional monitoring   Recommendation: Self-Monitoring   Status: Patient Agreed - Adherence/Education   Identified Date: 1/3/2025 Completed Date: 1/3/2025            "

## 2025-01-03 NOTE — PATIENT INSTRUCTIONS
"Recommendations from today's MTM visit:                                                       Monitor your Blood Sugars 4 times a day.      Time Goals   Fastin minutes after waking up  Before Breakfast Between 80 and 130 mg/dl   Post Meal:   2 hour after eating    Less than 180 mg/dl             Follow-up:   Appointments in Next Year      [place] 15, 2025 1:00 PM  (Arrive by 12:45 PM)  US ABDOMEN LIMITED with MPSYUS1  [facility] ([facility]) [phone]     2025 10:40 AM  Return Visit with Ron Headley DO  [facility] ([facility]) [phone]     2025 10:30 AM  (Arrive by 10:15 AM)  [facility] with Felice Sanchez PA-C  [facility] [place] ([facility] ) [phone]            It was great speaking with you today.  I value your experience and would be very thankful for your time in providing feedback in our clinic survey. In the next few days, you may receive an email or text message from [facility] [employer] with a link to a survey related to your  clinical pharmacist.\"     To schedule another MTM appointment, please call the clinic directly or you may call the MTM scheduling line at [phone] or toll-free at [phone].     My Clinical Pharmacist's contact information:                                                      Please feel free to contact me with any questions or concerns you have.      Da Vincent, Pharm.D.  [facility]  [phone]  [address]    Monday 10-12 noon, Tues: 8-12 noon, Wed 8-5 PM, Friday 8-5 PM  Contact me via [employer]      "

## 2025-01-15 ENCOUNTER — ANCILLARY PROCEDURE (OUTPATIENT)
Dept: ULTRASOUND IMAGING | Facility: CLINIC | Age: 40
End: 2025-01-15

## 2025-01-15 DIAGNOSIS — B19.10 HEPATITIS B INFECTION WITHOUT DELTA AGENT WITHOUT HEPATIC COMA, UNSPECIFIED CHRONICITY: ICD-10-CM

## 2025-01-15 PROCEDURE — 76705 ECHO EXAM OF ABDOMEN: CPT | Mod: GC | Performed by: RADIOLOGY

## 2025-01-15 NOTE — NURSING NOTE
Due to patient being non-English speaking/uses sign language, an  was used for this visit. Only for face-to-face interpretation by an external agency, date and length of interpretation can be found on the scanned worksheet.     name: Riddhi  Agency: [employer]  Language: Tajik   Telephone number: .  Type of interpretation: Face-to-face, spoken

## 2025-01-21 ENCOUNTER — OFFICE VISIT (OUTPATIENT)
Dept: FAMILY MEDICINE | Facility: CLINIC | Age: 40
End: 2025-01-21

## 2025-01-21 VITALS
HEART RATE: 84 BPM | OXYGEN SATURATION: 100 % | TEMPERATURE: 97.8 F | BODY MASS INDEX: 26.07 KG/M2 | HEIGHT: 68 IN | SYSTOLIC BLOOD PRESSURE: 127 MMHG | DIASTOLIC BLOOD PRESSURE: 84 MMHG | RESPIRATION RATE: 16 BRPM | WEIGHT: 172 LBS

## 2025-01-21 DIAGNOSIS — B19.10 HEPATITIS B INFECTION WITHOUT DELTA AGENT WITHOUT HEPATIC COMA, UNSPECIFIED CHRONICITY: ICD-10-CM

## 2025-01-21 DIAGNOSIS — E11.9 TYPE 2 DIABETES MELLITUS WITHOUT COMPLICATION, WITHOUT LONG-TERM CURRENT USE OF INSULIN (H): Primary | ICD-10-CM

## 2025-01-21 LAB
CREAT UR-MCNC: 137 MG/DL
MICROALBUMIN UR-MCNC: <12 MG/L
MICROALBUMIN/CREAT UR: NORMAL MG/G{CREAT}

## 2025-01-21 RX ORDER — ROSUVASTATIN CALCIUM 10 MG/1
10 TABLET, COATED ORAL DAILY
Qty: 30 TABLET | Refills: 2 | Status: SHIPPED | OUTPATIENT
Start: 2025-01-21

## 2025-01-21 NOTE — LETTER
January 22, 2025      Venkat ELLY Leda  [address]   [place] [address]        Dear ,    We are writing to inform you of your test results.    Your test results fall within the expected range(s) or remain unchanged from previous results.  Please continue with current treatment plan.    Resulted Orders   Albumin Random Urine Quantitative with Creat Ratio   Result Value Ref Range    Creatinine Urine mg/dL 137.0 mg/dL      Comment:      The reference ranges have not been established in urine creatinine. The results should be integrated into the clinical context for interpretation.    Albumin Urine mg/L <12.0 mg/L      Comment:      The reference ranges have not been established in urine albumin. The results should be integrated into the clinical context for interpretation.    Albumin Urine mg/g Cr        Comment:      Unable to calculate, urine albumin and/or urine creatinine is outside detectable limits.  Microalbuminuria is defined as an albumin:creatinine ratio of 17 to 299 for males and 25 to 299 for females. A ratio of albumin:creatinine of 300 or higher is indicative of overt proteinuria.  Due to biologic variability, positive results should be confirmed by a second, first-morning random or 24-hour timed urine specimen. If there is discrepancy, a third specimen is recommended. When 2 out of 3 results are in the microalbuminuria range, this is evidence for incipient nephropathy and warrants increased efforts at glucose control, blood pressure control, and institution of therapy with an angiotensin-converting-enzyme (ACE) inhibitor (if the patient can tolerate it).         If you have any questions or concerns, please call the clinic at the number listed above.       Sincerely,      Ron Headley, DO    Electronically signed

## 2025-01-21 NOTE — PROGRESS NOTES
Home blood sugar values:    Record of home blood sugars      Date Fasting AM Before lunch After lunch Before Dinner After Dinner Before Bedtime Late night   1/21  114        1/20    102      1/18    104      1/17 106         1/16 103         1/14 103., 108. 172, 122         1/12 112

## 2025-01-21 NOTE — PROGRESS NOTES
"Assessment & Plan     Type 2 diabetes mellitus without complication, without long-term current use of insulin (H)  Significant improvement in blood sugars, please see pharmacy note on today's date for specific numbers. Does not currently have health insurance but is confident it will be coming in soon. Patient wanting to continue with both Jardiance and Metformin at present, has successfully titrated up to both full doses.  - Optometry referral    Hepatitis B infection without delta agent without hepatic coma, unspecified chronicity  Recent lab tests reviewed with patient, no evidence of significant liver injury on lab eval or on RUQ US.  - Follow-up with hepatology scheduled for 2/19    The longitudinal plan of care for the diagnosis(es)/condition(s) as documented were addressed during this visit. Due to the added complexity in care, I will continue to support Venkat in the subsequent management and with ongoing continuity of care.  Return in about 2 months (around 3/21/2025) for Diabetes follow-up & lab check.    Karol Robledo is a 39 year old, presenting for the following health issues:  RECHECK        1/21/2025    10:33 AM   Additional Questions   Roomed by Ohn   Accompanied by Self     HPI     T2DM - Dramatic improvement in sugars since starting meds and paying close adherence to lifestyle modification    Hep B infection - Has appt with GI scheduled for 2/19    Review of Systems  Constitutional, eye, ENT, skin, respiratory, cardiac, and GI are normal except as otherwise noted.    Objective    /84   Pulse 84   Temp 97.8  F (36.6  C) (Temporal)   Resp 16   Ht 1.727 m (5' 8\")   Wt 78 kg (172 lb)   SpO2 100%   BMI 26.15 kg/m    Physical Exam   Vitals reviewed.   Constitutional: awake, alert, cooperative, in NAD  Eyes: Sclera without injection, EOM intact   Respiratory: Normal pulmonary effort without coughing or wheezing  Skin: No visible lesions, erythema, rashes, or " ecchymosis  Musculoskeletal: No obvious joint or extremity deformity, edema, or erythema, ROM grossly intact  Neurologic: A&Ox4, without focal deficit, cranial nerves II-XII grossly intact  Psychiatric: Alert & calm with appropriate affect, mood, insight, and thought processes     The following results were reviewed:   Office Visit on 12/20/2024   Component Date Value    Hepatitis B DNA IU/mL, I* 12/20/2024 107 (H)     Hepatitis B log 12/20/2024 2.0     Hepatitis Be Agn 12/20/2024 Negative     INR 12/20/2024 1.10     WBC Count 12/20/2024 4.3     RBC Count 12/20/2024 5.01     Hemoglobin 12/20/2024 15.0     Hematocrit 12/20/2024 44.1     MCV 12/20/2024 88     MCH 12/20/2024 29.9     MCHC 12/20/2024 34.0     RDW 12/20/2024 11.6     Platelet Count 12/20/2024 247    Office Visit on 12/16/2024   Component Date Value    HIV Antigen Antibody Com* 12/16/2024 Nonreactive     Hepatitis C Antibody 12/16/2024 Nonreactive     Cholesterol 12/16/2024 230 (H)     Triglycerides 12/16/2024 300 (H)     Direct Measure HDL 12/16/2024 34 (L)     LDL Cholesterol Calculat* 12/16/2024 136 (H)     Non HDL Cholesterol 12/16/2024 196 (H)     Patient Fasting > 8hrs? 12/16/2024 Yes     Estimated Average Glucose 12/16/2024 272 (H)     Hemoglobin A1C 12/16/2024 11.1 (H)     Hepatitis B Core Antibod* 12/16/2024 Reactive (A)     Hepatitis B Surface Anti* 12/16/2024 Reactive (A)     Hepatitis B Surface Anti* 12/16/2024 Nonreactive     Hepatitis B Surface Anti* 12/16/2024 <3.50     Sodium 12/16/2024 133 (L)     Potassium 12/16/2024 4.1     Carbon Dioxide (CO2) 12/16/2024 28     Anion Gap 12/16/2024 10     Urea Nitrogen 12/16/2024 10.7     Creatinine 12/16/2024 0.67     GFR Estimate 12/16/2024 >90     Calcium 12/16/2024 9.5     Chloride 12/16/2024 95 (L)     Glucose 12/16/2024 298 (H)     Alkaline Phosphatase 12/16/2024 149     AST 12/16/2024 30     ALT 12/16/2024 38     Protein Total 12/16/2024 8.4 (H)     Albumin 12/16/2024 4.3     Bilirubin Total  12/16/2024 0.4     Patient Fasting > 8hrs? 12/16/2024 Yes     Rubeola (Measles) Denice Ig* 12/16/2024 >300.0     Rubeola (Measles) Antibo* 12/16/2024 Positive     Rubella Denice IgG Instrume* 12/16/2024 >33.00     Rubella Antibody IgG 12/16/2024 Positive     Mumps Denice IgG Instrument* 12/16/2024 75.9     Mumps Antibody IgG 12/16/2024 Positive         Signed Electronically by: Ron Headley DO

## 2025-02-05 DIAGNOSIS — B19.10 HEPATITIS B VIRUS INFECTION, UNSPECIFIED CHRONICITY: Primary | ICD-10-CM

## 2025-02-06 ENCOUNTER — TELEPHONE (OUTPATIENT)
Dept: GASTROENTEROLOGY | Facility: CLINIC | Age: 40
End: 2025-02-06

## 2025-02-06 NOTE — TELEPHONE ENCOUNTER
Called patient to schedule pre-visit lab appointment prior to upcoming hepatology appointment on 2/19.    Patient reported he does not have insurance. Patient is working with the [facility] right now to get insurance set up. Writer did not schedule pre-visit lab appointment for now. For now, hepatology appointment kept as is. Advised patient to discuss December lab results with Ron Headley DO.     Scheduled patient with financial counselor on 2/10. Informed patient 2/19 hepatology appointment may need to be canceled. Message sent to financial counselor team to let writer know determination of 2/10 appointment.     VERONICA Su, RN, PHN  Hepatology Clinic  [facility]  [facility]

## 2025-02-18 NOTE — PROGRESS NOTES
Virtual Visit Details    Type of service:  Video Visit   10:32 am - 11:02 an   Originating Location (pt. Location): Home  Distant Location (provider location):  Off-site  Platform used for Video Visit: [facility]    Hepatology Clinic Note  Venkat Tompkins   Date of Birth 1985    REASON FOR CONSULTATION: Hepatitis B  REFERRING PROVIDER: Ron Headley DO         Assessment/plan:   Venkat Tompkins is a 39 year old male with chronic hepatitis B, e antigen negative  HBV DNA: low  ALT/AST: relatively normal   Fibrosis Assessment: none previously   Liver Function: low suspicion of advanced fibrosis     # Chronic Hepatitis B, inactive  - Treatment: not indicated    - Follow up labs in 12 months: Hepatic panel, CBC, INR, BMP, HBV DNA quant.  - HCC screening: US abdomen due 12 months  - Will discuss FibroScan in the future     # Counseling:   - We discussed Hepatitis B transmission, the natural course of Hepatitis B and indications for treatment. We also discussed the importance of regular labs and follow-up.   - Recommended Hepatitis B testing for other members of the household and immunization if necessary as well as any new sexual partners.     # Metabolic associated fatty liver disease (MALFD):   # DM Type 2, last hemoglobin A1c 11.1 in December 2024  - Maintain good control of cholesterol (No contraindication to starting a statin with LFT elevations)   - Continue optimization blood glucose/insulin resistance as needed.   - Improve nutrition: continue limiting carbohydrates, especially simple carbohydrates, and following Mediterranean eating patten  - Increase physical activity as able, ideally 150 minutes weekly  - Limit alcohol intake to not more than 3 drinks a week     - Follow-up in clinic 12 months     Felice Sanchez PA-C   [facility]    -----------------------------------------------------       HPI:   Venkat Tompkins is a 39 year old male  presenting for evaluation and  treatment of Hepatitis B.     Hepatitis B   -E antigen negative  -E antibody  -Diagnosed: years ago  -History: likely early childhood transmission   -Prior biopsy: no   -Prior treatments: no     Patient was diagnosed with Hepatitis B a number of years of ago.  He denies any previous treatment.    He reports his appetite is a bit decreased.  His weight though has now been stable in the past few months.  Prior to that he had lost about 22 pounds in the past year  10 kg weight loss in the past year.  Hemoglobin A1c was 11.1 and he was recently started on metformin.  He was also prescribed Jardiance, but has recently lost his health insurance is and was not able to afford prescription.  Reports that his wife's income was too high for certain types of insurance.    Sometimes he is more constipated and has to use a laxative  Sometimes have to use a laxative   Called [employer] - ordered from [employer]-   Which appears to be a supplement with flaxseed powder, psyllium husk, lactobacillus assess the  Acidophilus MCT Oil, senna leaf, cascara, aloe vera, licorice root.    Patient denies jaundice, lower extremity edema, abdominal distension or confusion.  Patient also denies melena, hematochezia or hematemesis. Patient denies  fevers, sweats or chills.    PMH: DM type 2, hepatic steatosis, GERD     [facility]:  has no past surgical history on file.     Medications: see below    No previous tobacco use. No alcohol. No previous IV/IN drug use. Previously worked at [employer] . Patient currently lives with wife, unclear if she has chronic Hepatitis B. Family history     Hepatitis B:   Lab Results   Component Value Date    HEPBANG Reactive (A) 12/16/2024    HBCAB Reactive (A) 12/16/2024    AUSAB <3.50 12/16/2024    HCVAB Nonreactive 12/16/2024     Lab Results   Component Value Date    HBQRESINST 107 (H) 12/20/2024     Lab Results   Component Value Date    HBEAGN Negative 12/20/2024          Medications:     Current Outpatient Medications    Medication Sig Dispense Refill    blood glucose (NO BRAND SPECIFIED) test strip Use to test blood sugar 2 times daily or as directed. To accompany: Blood Glucose Monitor Brands: per insurance. 100 strip 6    blood glucose monitoring (NO BRAND SPECIFIED) meter device kit Use to test blood sugar 2 times daily or as directed. Preferred blood glucose meter OR supplies to accompany: Blood Glucose Monitor Brands: per insurance. 1 kit 0    empagliflozin (JARDIANCE) 10 MG TABS tablet Take 1 tablet (10 mg) by mouth daily. 30 tablet 2    famotidine (PEPCID) 40 MG tablet Take 1 tablet (40 mg) by mouth daily. 30 tablet 11    metFORMIN (GLUCOPHAGE XR) 500 MG 24 hr tablet Take 1 tablet (500 mg) by mouth daily for 7 days, THEN 2 tablets (1,000 mg) daily for 7 days, THEN 3 tablets (1,500 mg) daily for 7 days, THEN 4 tablets (2,000 mg) daily. 360 tablet 0    rosuvastatin (CRESTOR) 10 MG tablet Take 1 tablet (10 mg) by mouth daily. 30 tablet 2    thin (NO BRAND SPECIFIED) lancets Use with lanceting device. To accompany: Blood Glucose Monitor Brands: per insurance. 100 each 6     No current facility-administered medications for this visit.            Allergies:   No Known Allergies         Social History:     Social History     Socioeconomic History    Marital status:      Spouse name: Not on file    Number of children: Not on file    Years of education: Not on file    Highest education level: Not on file   Occupational History    Not on file   Tobacco Use    Smoking status: Never     Passive exposure: Never    Smokeless tobacco: Never   Substance and Sexual Activity    Alcohol use: Not on file    Drug use: Not on file    Sexual activity: Not on file   Other Topics Concern    Not on file   Social History Narrative    Not on file     Social Drivers of Health     Financial Resource Strain: Low Risk  (12/16/2024)    Financial Resource Strain     Within the past 12 months, have you or your family members you live with been unable  to get utilities (heat, electricity) when it was really needed?: No   Food Insecurity: Low Risk  (12/16/2024)    Food Insecurity     Within the past 12 months, did you worry that your food would run out before you got money to buy more?: No     Within the past 12 months, did the food you bought just not last and you didn t have money to get more?: No   Transportation Needs: Low Risk  (12/16/2024)    Transportation Needs     Within the past 12 months, has lack of transportation kept you from medical appointments, getting your medicines, non-medical meetings or appointments, work, or from getting things that you need?: No   Physical Activity: Not on file   Stress: Not on file   Social Connections: Not on file   Interpersonal Safety: Low Risk  (12/16/2024)    Interpersonal Safety     Do you feel physically and emotionally safe where you currently live?: Yes     Within the past 12 months, have you been hit, slapped, kicked or otherwise physically hurt by someone?: No     Within the past 12 months, have you been humiliated or emotionally abused in other ways by your partner or ex-partner?: No   Housing Stability: Low Risk  (12/16/2024)    Housing Stability     Do you have housing? : Yes     Are you worried about losing your housing?: No            Family History:   No family history on file.         Review of Systems:   GEN: See HPI  HEENT: No change in vision or hearing, mouth sores, dysphagia, lymph nodes  Resp: No shortness of breath, coughing, hx of asthma  CV: No chest pain, palpitations, syncope   GI: See HPI  : No dysuria, history of stones, urine color    Skin: No rash; no pruritus or psoriasis  MS: No arthralgias, myalgias, joint swelling  Neuro: No memory changes, confusion, numbness    Heme: No difficulty clotting, bruising, bleeding  Psych:  No anxiety, depression, agitation          Physical Exam:   There were no vitals taken for this visit.    GENERAL: healthy, alert and no distress  EYES: Eyes grossly  "normal to inspection, conjunctivae and sclerae normal  RESP: no audible wheeze, cough, or visible cyanosis.  No visible retractions or increased work of breathing.  Able to speak fully in complete sentences  NEURO: Cranial nerves grossly intact, mentation intact and speech normal  PSYCH: mentation appears normal, affect normal/bright, judgement and insight intact, normal speech and appearance well-groomed         Data:   Reviewed in person and significant for:    Lab Results   Component Value Date     12/16/2024      Lab Results   Component Value Date    POTASSIUM 4.1 12/16/2024     Lab Results   Component Value Date    CHLORIDE 95 12/16/2024     Lab Results   Component Value Date    CO2 28 12/16/2024     Lab Results   Component Value Date    BUN 10.7 12/16/2024     Lab Results   Component Value Date    CR 0.67 12/16/2024       Lab Results   Component Value Date    WBC 4.3 12/20/2024     Lab Results   Component Value Date    HGB 15.0 12/20/2024     Lab Results   Component Value Date    HCT 44.1 12/20/2024     Lab Results   Component Value Date    MCV 88 12/20/2024     Lab Results   Component Value Date     12/20/2024       Lab Results   Component Value Date    AST 30 12/16/2024     Lab Results   Component Value Date    ALT 38 12/16/2024     No results found for: \"BILICONJ\"   Lab Results   Component Value Date    BILITOTAL 0.4 12/16/2024       Lab Results   Component Value Date    ALBUMIN 4.3 12/16/2024     Lab Results   Component Value Date    PROTTOTAL 8.4 12/16/2024      Lab Results   Component Value Date    ALKPHOS 149 12/16/2024       Lab Results   Component Value Date    INR 1.10 12/20/2024       EXAMINATION: US ABDOMEN LIMITED, 1/15/2025 1:33 PM      INDICATION: Hepatitis B infection without delta agent without hepatic  coma, unspecified chronicity     COMPARISON: CT abdomen/pelvis 8/4/2024.     TECHNIQUE: The abdomen right upper quadrant was scanned in the  standard fashion with specialized " ultrasound transducer(s) using both  gray scale and limited color/spectral Doppler techniques.     FINDINGS:   Fluid: No evidence of ascites or pleural effusions.     Liver: The liver demonstrates increased parenchymal echogenicity and  mildly coarsened echotexture, measuring 14.6 cm in craniocaudal  dimension. Ill-defined focal hypoechogenicity adjacent to the  gallbladder measuring 1.2 x 1.2 x 1.4 cm, likely focal fatty sparing.  No intrahepatic biliary dilatation. The nondilated main portal vein is  patent with antegrade flow.     US visualization score: A - No or minimal limitations     Gallbladder: The gallbladder is well distended and of normal  morphology. No wall thickening, pericholecystic fluid, sonographic  Caputo's sign, or evidence of cholelithiasis.     Bile Ducts: Normal caliber intra and extrahepatic biliary tree. The  common bile duct measures 2 mm in diameter.     Pancreas: Not visualized due to shadowing bowel gas.     Kidney: The right kidney measures 9.8 cm in long dimension. No  hydronephrosis, hydroureter, shadowing renal calculi, or solid mass.  The capsule and parenchyma demonstrate normal echogenicity.                                                                      IMPRESSION:   1. Increased hepatic parenchymal echogenicity consistent with hepatic  steatosis with focal fatty sparing adjacent to the gallbladder. No  focal suspicious hepatic lesion.  a. LI-RADS US Category: US-1 Negative: No US evidence of HCC  b. Recommend continued surveillance US.  2. Otherwise normal right upper quadrant ultrasound.     I have personally reviewed the examination and initial interpretation  and I agree with the findings.     WILEY LILLY, DO        EXAM: CT ABDOMEN PELVIS W CONTRAST  LOCATION: [facility]  DATE: 6/4/2024     INDICATION: Right sided abdominal pain  COMPARISON: None.  TECHNIQUE: CT scan of the abdomen and pelvis was performed following  injection of IV contrast. Multiplanar reformats were obtained. Dose reduction techniques were used.  CONTRAST: 89mL Isovue 370     FINDINGS:   LOWER CHEST: Normal.     HEPATOBILIARY: Diffuse hepatic steatosis. No significant mass. No bile duct dilatation. No calcified gallstones.     PANCREAS: Normal.     SPLEEN: Normal.     ADRENAL GLANDS: Normal.     KIDNEYS/BLADDER: No significant mass, stone, or hydronephrosis. Overdistended bladder.     BOWEL: Normal.     LYMPH NODES: Normal.     VASCULATURE: Normal.     PELVIC ORGANS: Normal.     MUSCULOSKELETAL: Normal.                                                                      IMPRESSION:   1.  No acute findings in the abdomen or pelvis.     2.  Hepatic steatosis.     3.  Overdistended urinary bladder, but no evidence of acute inflammation.

## 2025-02-19 ENCOUNTER — VIRTUAL VISIT (OUTPATIENT)
Dept: GASTROENTEROLOGY | Facility: CLINIC | Age: 40
End: 2025-02-19

## 2025-02-19 DIAGNOSIS — B19.10 HEPATITIS B INFECTION WITHOUT DELTA AGENT WITHOUT HEPATIC COMA, UNSPECIFIED CHRONICITY: ICD-10-CM

## 2025-02-19 PROCEDURE — 98002 SYNCH AUDIO-VIDEO NEW MOD 45: CPT | Performed by: PHYSICIAN ASSISTANT

## 2025-02-19 ASSESSMENT — PAIN SCALES - GENERAL: PAINLEVEL_OUTOF10: NO PAIN (0)

## 2025-02-19 NOTE — NURSING NOTE
Current patient location: Patient declined to provide     Is the patient currently in the state of [place]? YES    Visit mode: VIDEO    If the visit is dropped, the patient can be reconnected by:VIDEO VISIT: Text to cell phone:   Telephone Information:   Mobile [phone]       Will anyone else be joining the visit? NO  (If patient encounters technical issues they should call [phone])    Are changes needed to the allergy or medication list? No    Are refills needed on medications prescribed by this physician? NO    Rooming Documentation:  Questionnaire(s) completed    Reason for visit: Consult (Hep B)    Lucy GOLDBERGF

## 2025-02-19 NOTE — Clinical Note
FYI - patient has lost his insurance at some point. Sounds like Jardiance was not likely affordable. I don't know if he will be able to follow up for clinic, will defer to you other medications. Blood glucose levels in the 100's this morning. Could one of your clinic's social workers/[place] interpreters reach out to discuss and help with barriers. His wife works and income was too high for certain [facility] plans. Thanks for your time! Regards, Felice

## 2025-02-19 NOTE — LETTER
2/19/2025      Venkat Tompkins  [address]   [facility] [address]      Dear Colleague,    Thank you for referring your patient, Venkat Tompkins, to the [facility] [place]. Please see a copy of my visit note below.    Virtual Visit Details    Type of service:  Video Visit   10:32 am - 11:02 an   Originating Location (pt. Location): Home  Distant Location (provider location):  Off-site  Platform used for Video Visit: [facility]    Hepatology Clinic Note  Venkat Tompkins   Date of Birth 1985    REASON FOR CONSULTATION: Hepatitis B  REFERRING PROVIDER: Ron Headley DO         Assessment/plan:   Venkat Tompkins is a 39 year old male with chronic hepatitis B, e antigen negative  HBV DNA: low  ALT/AST: relatively normal   Fibrosis Assessment: none previously   Liver Function: low suspicion of advanced fibrosis     # Chronic Hepatitis B, inactive  - Treatment: not indicated    - Follow up labs in 12 months: Hepatic panel, CBC, INR, BMP, HBV DNA quant.  - HCC screening: US abdomen due 12 months  - Will discuss FibroScan in the future     # Counseling:   - We discussed Hepatitis B transmission, the natural course of Hepatitis B and indications for treatment. We also discussed the importance of regular labs and follow-up.   - Recommended Hepatitis B testing for other members of the household and immunization if necessary as well as any new sexual partners.     # Metabolic associated fatty liver disease (MALFD):   # DM Type 2, last hemoglobin A1c 11.1 in December 2024  - Maintain good control of cholesterol (No contraindication to starting a statin with LFT elevations)   - Continue optimization blood glucose/insulin resistance as needed.   - Improve nutrition: continue limiting carbohydrates, especially simple carbohydrates, and following Mediterranean eating patten  - Increase physical activity as able, ideally 150 minutes weekly  - Limit alcohol intake to not  more than 3 drinks a week     - Follow-up in clinic 12 months     Felice Sanchez PA-C   [facility] Hepatology    -----------------------------------------------------       HPI:   Venkat Tompkins is a 39 year old male  presenting for evaluation and treatment of Hepatitis B.     Hepatitis B   -E antigen negative  -E antibody  -Diagnosed: years ago  -History: likely early childhood transmission   -Prior biopsy: no   -Prior treatments: no     Patient was diagnosed with Hepatitis B a number of years of ago.  He denies any previous treatment.    He reports his appetite is a bit decreased.  His weight though has now been stable in the past few months.  Prior to that he had lost about 22 pounds in the past year  10 kg weight loss in the past year.  Hemoglobin A1c was 11.1 and he was recently started on metformin.  He was also prescribed Jardiance, but has recently lost his health insurance is and was not able to afford prescription.  Reports that his wife's income was too high for certain types of insurance.    Sometimes he is more constipated and has to use a laxative  Sometimes have to use a laxative   Called [employer] - ordered from [employer]-   Which appears to be a supplement with flaxseed powder, psyllium husk, lactobacillus assess the  Acidophilus MCT Oil, senna leaf, cascara, aloe vera, licorice root.    Patient denies jaundice, lower extremity edema, abdominal distension or confusion.  Patient also denies melena, hematochezia or hematemesis. Patient denies  fevers, sweats or chills.    PMH: DM type 2, hepatic steatosis, GERD     [facility]:  has no past surgical history on file.     Medications: see below    No previous tobacco use. No alcohol. No previous IV/IN drug use. Previously worked at [employer] . Patient currently lives with wife, unclear if she has chronic Hepatitis B. Family history     Hepatitis B:   Lab Results   Component Value Date    HEPBANG Reactive (A) 12/16/2024    HBCAB Reactive (A) 12/16/2024     AUSAB <3.50 12/16/2024    HCVAB Nonreactive 12/16/2024     Lab Results   Component Value Date    HBQRESINST 107 (H) 12/20/2024     Lab Results   Component Value Date    HBEAGN Negative 12/20/2024          Medications:     Current Outpatient Medications   Medication Sig Dispense Refill     blood glucose (NO BRAND SPECIFIED) test strip Use to test blood sugar 2 times daily or as directed. To accompany: Blood Glucose Monitor Brands: per insurance. 100 strip 6     blood glucose monitoring (NO BRAND SPECIFIED) meter device kit Use to test blood sugar 2 times daily or as directed. Preferred blood glucose meter OR supplies to accompany: Blood Glucose Monitor Brands: per insurance. 1 kit 0     empagliflozin (JARDIANCE) 10 MG TABS tablet Take 1 tablet (10 mg) by mouth daily. 30 tablet 2     famotidine (PEPCID) 40 MG tablet Take 1 tablet (40 mg) by mouth daily. 30 tablet 11     metFORMIN (GLUCOPHAGE XR) 500 MG 24 hr tablet Take 1 tablet (500 mg) by mouth daily for 7 days, THEN 2 tablets (1,000 mg) daily for 7 days, THEN 3 tablets (1,500 mg) daily for 7 days, THEN 4 tablets (2,000 mg) daily. 360 tablet 0     rosuvastatin (CRESTOR) 10 MG tablet Take 1 tablet (10 mg) by mouth daily. 30 tablet 2     thin (NO BRAND SPECIFIED) lancets Use with lanceting device. To accompany: Blood Glucose Monitor Brands: per insurance. 100 each 6     No current facility-administered medications for this visit.            Allergies:   No Known Allergies         Social History:     Social History     Socioeconomic History     Marital status:      Spouse name: Not on file     Number of children: Not on file     Years of education: Not on file     Highest education level: Not on file   Occupational History     Not on file   Tobacco Use     Smoking status: Never     Passive exposure: Never     Smokeless tobacco: Never   Substance and Sexual Activity     Alcohol use: Not on file     Drug use: Not on file     Sexual activity: Not on file   Other  Topics Concern     Not on file   Social History Narrative     Not on file     Social Drivers of Health     Financial Resource Strain: Low Risk  (12/16/2024)    Financial Resource Strain      Within the past 12 months, have you or your family members you live with been unable to get utilities (heat, electricity) when it was really needed?: No   Food Insecurity: Low Risk  (12/16/2024)    Food Insecurity      Within the past 12 months, did you worry that your food would run out before you got money to buy more?: No      Within the past 12 months, did the food you bought just not last and you didn t have money to get more?: No   Transportation Needs: Low Risk  (12/16/2024)    Transportation Needs      Within the past 12 months, has lack of transportation kept you from medical appointments, getting your medicines, non-medical meetings or appointments, work, or from getting things that you need?: No   Physical Activity: Not on file   Stress: Not on file   Social Connections: Not on file   Interpersonal Safety: Low Risk  (12/16/2024)    Interpersonal Safety      Do you feel physically and emotionally safe where you currently live?: Yes      Within the past 12 months, have you been hit, slapped, kicked or otherwise physically hurt by someone?: No      Within the past 12 months, have you been humiliated or emotionally abused in other ways by your partner or ex-partner?: No   Housing Stability: Low Risk  (12/16/2024)    Housing Stability      Do you have housing? : Yes      Are you worried about losing your housing?: No            Family History:   No family history on file.         Review of Systems:   GEN: See HPI  HEENT: No change in vision or hearing, mouth sores, dysphagia, lymph nodes  Resp: No shortness of breath, coughing, hx of asthma  CV: No chest pain, palpitations, syncope   GI: See HPI  : No dysuria, history of stones, urine color    Skin: No rash; no pruritus or psoriasis  MS: No arthralgias, myalgias, joint  "swelling  Neuro: No memory changes, confusion, numbness    Heme: No difficulty clotting, bruising, bleeding  Psych:  No anxiety, depression, agitation          Physical Exam:   There were no vitals taken for this visit.    GENERAL: healthy, alert and no distress  EYES: Eyes grossly normal to inspection, conjunctivae and sclerae normal  RESP: no audible wheeze, cough, or visible cyanosis.  No visible retractions or increased work of breathing.  Able to speak fully in complete sentences  NEURO: Cranial nerves grossly intact, mentation intact and speech normal  PSYCH: mentation appears normal, affect normal/bright, judgement and insight intact, normal speech and appearance well-groomed         Data:   Reviewed in person and significant for:    Lab Results   Component Value Date     12/16/2024      Lab Results   Component Value Date    POTASSIUM 4.1 12/16/2024     Lab Results   Component Value Date    CHLORIDE 95 12/16/2024     Lab Results   Component Value Date    CO2 28 12/16/2024     Lab Results   Component Value Date    BUN 10.7 12/16/2024     Lab Results   Component Value Date    CR 0.67 12/16/2024       Lab Results   Component Value Date    WBC 4.3 12/20/2024     Lab Results   Component Value Date    HGB 15.0 12/20/2024     Lab Results   Component Value Date    HCT 44.1 12/20/2024     Lab Results   Component Value Date    MCV 88 12/20/2024     Lab Results   Component Value Date     12/20/2024       Lab Results   Component Value Date    AST 30 12/16/2024     Lab Results   Component Value Date    ALT 38 12/16/2024     No results found for: \"BILICONJ\"   Lab Results   Component Value Date    BILITOTAL 0.4 12/16/2024       Lab Results   Component Value Date    ALBUMIN 4.3 12/16/2024     Lab Results   Component Value Date    PROTTOTAL 8.4 12/16/2024      Lab Results   Component Value Date    ALKPHOS 149 12/16/2024       Lab Results   Component Value Date    INR 1.10 12/20/2024       EXAMINATION: US ABDOMEN " LIMITED, 1/15/2025 1:33 PM      INDICATION: Hepatitis B infection without delta agent without hepatic  coma, unspecified chronicity     COMPARISON: CT abdomen/pelvis 8/4/2024.     TECHNIQUE: The abdomen right upper quadrant was scanned in the  standard fashion with specialized ultrasound transducer(s) using both  gray scale and limited color/spectral Doppler techniques.     FINDINGS:   Fluid: No evidence of ascites or pleural effusions.     Liver: The liver demonstrates increased parenchymal echogenicity and  mildly coarsened echotexture, measuring 14.6 cm in craniocaudal  dimension. Ill-defined focal hypoechogenicity adjacent to the  gallbladder measuring 1.2 x 1.2 x 1.4 cm, likely focal fatty sparing.  No intrahepatic biliary dilatation. The nondilated main portal vein is  patent with antegrade flow.     US visualization score: A - No or minimal limitations     Gallbladder: The gallbladder is well distended and of normal  morphology. No wall thickening, pericholecystic fluid, sonographic  Caputo's sign, or evidence of cholelithiasis.     Bile Ducts: Normal caliber intra and extrahepatic biliary tree. The  common bile duct measures 2 mm in diameter.     Pancreas: Not visualized due to shadowing bowel gas.     Kidney: The right kidney measures 9.8 cm in long dimension. No  hydronephrosis, hydroureter, shadowing renal calculi, or solid mass.  The capsule and parenchyma demonstrate normal echogenicity.                                                                      IMPRESSION:   1. Increased hepatic parenchymal echogenicity consistent with hepatic  steatosis with focal fatty sparing adjacent to the gallbladder. No  focal suspicious hepatic lesion.  a. LI-RADS US Category: US-1 Negative: No US evidence of HCC  b. Recommend continued surveillance US.  2. Otherwise normal right upper quadrant ultrasound.     I have personally reviewed the examination and initial interpretation  and I agree with the findings.      WILEY LILLY,         EXAM: CT ABDOMEN PELVIS W CONTRAST  LOCATION: [facility]  DATE: 6/4/2024     INDICATION: Right sided abdominal pain  COMPARISON: None.  TECHNIQUE: CT scan of the abdomen and pelvis was performed following injection of IV contrast. Multiplanar reformats were obtained. Dose reduction techniques were used.  CONTRAST: 89mL Isovue 370     FINDINGS:   LOWER CHEST: Normal.     HEPATOBILIARY: Diffuse hepatic steatosis. No significant mass. No bile duct dilatation. No calcified gallstones.     PANCREAS: Normal.     SPLEEN: Normal.     ADRENAL GLANDS: Normal.     KIDNEYS/BLADDER: No significant mass, stone, or hydronephrosis. Overdistended bladder.     BOWEL: Normal.     LYMPH NODES: Normal.     VASCULATURE: Normal.     PELVIC ORGANS: Normal.     MUSCULOSKELETAL: Normal.                                                                      IMPRESSION:   1.  No acute findings in the abdomen or pelvis.     2.  Hepatic steatosis.     3.  Overdistended urinary bladder, but no evidence of acute inflammation.      Again, thank you for allowing me to participate in the care of your patient.        Sincerely,        Felice Sanchez PA-C    Electronically signed

## 2025-04-01 DIAGNOSIS — E11.9 TYPE 2 DIABETES MELLITUS WITHOUT COMPLICATION, WITHOUT LONG-TERM CURRENT USE OF INSULIN (H): Primary | ICD-10-CM

## 2025-08-04 ENCOUNTER — TELEPHONE (OUTPATIENT)
Dept: FAMILY MEDICINE | Facility: CLINIC | Age: 40
End: 2025-08-04

## 2025-08-27 ENCOUNTER — TELEPHONE (OUTPATIENT)
Dept: GASTROENTEROLOGY | Facility: CLINIC | Age: 40
End: 2025-08-27